# Patient Record
Sex: MALE | Race: WHITE | NOT HISPANIC OR LATINO | Employment: OTHER | URBAN - METROPOLITAN AREA
[De-identification: names, ages, dates, MRNs, and addresses within clinical notes are randomized per-mention and may not be internally consistent; named-entity substitution may affect disease eponyms.]

---

## 2018-07-20 ENCOUNTER — TELEPHONE (OUTPATIENT)
Dept: FAMILY MEDICINE CLINIC | Facility: CLINIC | Age: 28
End: 2018-07-20

## 2021-07-15 ENCOUNTER — APPOINTMENT (OUTPATIENT)
Dept: GENERAL RADIOLOGY | Facility: HOSPITAL | Age: 31
End: 2021-07-15

## 2021-07-15 ENCOUNTER — HOSPITAL ENCOUNTER (EMERGENCY)
Facility: HOSPITAL | Age: 31
Discharge: HOME | End: 2021-07-15
Attending: EMERGENCY MEDICINE | Admitting: EMERGENCY MEDICINE

## 2021-07-15 VITALS
SYSTOLIC BLOOD PRESSURE: 138 MMHG | WEIGHT: 170 LBS | OXYGEN SATURATION: 99 % | BODY MASS INDEX: 22.53 KG/M2 | HEIGHT: 73 IN | DIASTOLIC BLOOD PRESSURE: 85 MMHG | TEMPERATURE: 98.7 F | RESPIRATION RATE: 18 BRPM | HEART RATE: 68 BPM

## 2021-07-15 DIAGNOSIS — R00.2 PALPITATIONS: Primary | ICD-10-CM

## 2021-07-15 LAB
ALBUMIN SERPL-MCNC: 4.3 G/DL (ref 3.5–5.2)
ALBUMIN/GLOB SERPL: 1.9 G/DL
ALP SERPL-CCNC: 38 U/L (ref 39–117)
ALT SERPL W P-5'-P-CCNC: 34 U/L (ref 1–41)
ANION GAP SERPL CALCULATED.3IONS-SCNC: 9.1 MMOL/L (ref 5–15)
AST SERPL-CCNC: 19 U/L (ref 1–40)
BASOPHILS # BLD AUTO: 0.06 10*3/MM3 (ref 0–0.2)
BASOPHILS NFR BLD AUTO: 1.2 % (ref 0–1.5)
BILIRUB SERPL-MCNC: 0.5 MG/DL (ref 0–1.2)
BUN SERPL-MCNC: 12 MG/DL (ref 6–20)
BUN/CREAT SERPL: 15 (ref 7–25)
CALCIUM SPEC-SCNC: 9.2 MG/DL (ref 8.6–10.5)
CHLORIDE SERPL-SCNC: 103 MMOL/L (ref 98–107)
CO2 SERPL-SCNC: 24.9 MMOL/L (ref 22–29)
CREAT SERPL-MCNC: 0.8 MG/DL (ref 0.76–1.27)
D DIMER PPP FEU-MCNC: <0.27 MCGFEU/ML (ref 0–0.46)
DEPRECATED RDW RBC AUTO: 39.2 FL (ref 37–54)
EOSINOPHIL # BLD AUTO: 0.07 10*3/MM3 (ref 0–0.4)
EOSINOPHIL NFR BLD AUTO: 1.5 % (ref 0.3–6.2)
ERYTHROCYTE [DISTWIDTH] IN BLOOD BY AUTOMATED COUNT: 11.9 % (ref 12.3–15.4)
GFR SERPL CREATININE-BSD FRML MDRD: 115 ML/MIN/1.73
GLOBULIN UR ELPH-MCNC: 2.3 GM/DL
GLUCOSE SERPL-MCNC: 121 MG/DL (ref 65–99)
HCT VFR BLD AUTO: 42.5 % (ref 37.5–51)
HGB BLD-MCNC: 14.6 G/DL (ref 13–17.7)
IMM GRANULOCYTES # BLD AUTO: 0 10*3/MM3 (ref 0–0.05)
IMM GRANULOCYTES NFR BLD AUTO: 0 % (ref 0–0.5)
LYMPHOCYTES # BLD AUTO: 1.7 10*3/MM3 (ref 0.7–3.1)
LYMPHOCYTES NFR BLD AUTO: 35.3 % (ref 19.6–45.3)
MAGNESIUM SERPL-MCNC: 2 MG/DL (ref 1.6–2.6)
MCH RBC QN AUTO: 30.7 PG (ref 26.6–33)
MCHC RBC AUTO-ENTMCNC: 34.4 G/DL (ref 31.5–35.7)
MCV RBC AUTO: 89.3 FL (ref 79–97)
MONOCYTES # BLD AUTO: 0.5 10*3/MM3 (ref 0.1–0.9)
MONOCYTES NFR BLD AUTO: 10.4 % (ref 5–12)
NEUTROPHILS NFR BLD AUTO: 2.49 10*3/MM3 (ref 1.7–7)
NEUTROPHILS NFR BLD AUTO: 51.6 % (ref 42.7–76)
NRBC BLD AUTO-RTO: 0 /100 WBC (ref 0–0.2)
NT-PROBNP SERPL-MCNC: 60.2 PG/ML (ref 0–450)
PLATELET # BLD AUTO: 188 10*3/MM3 (ref 140–450)
PMV BLD AUTO: 9 FL (ref 6–12)
POTASSIUM SERPL-SCNC: 3.9 MMOL/L (ref 3.5–5.2)
PROT SERPL-MCNC: 6.6 G/DL (ref 6–8.5)
QT INTERVAL: 394 MS
RBC # BLD AUTO: 4.76 10*6/MM3 (ref 4.14–5.8)
SODIUM SERPL-SCNC: 137 MMOL/L (ref 136–145)
TROPONIN T SERPL-MCNC: <0.01 NG/ML (ref 0–0.03)
WBC # BLD AUTO: 4.82 10*3/MM3 (ref 3.4–10.8)

## 2021-07-15 PROCEDURE — 93010 ELECTROCARDIOGRAM REPORT: CPT | Performed by: INTERNAL MEDICINE

## 2021-07-15 PROCEDURE — 93005 ELECTROCARDIOGRAM TRACING: CPT | Performed by: PHYSICIAN ASSISTANT

## 2021-07-15 PROCEDURE — 85025 COMPLETE CBC W/AUTO DIFF WBC: CPT | Performed by: PHYSICIAN ASSISTANT

## 2021-07-15 PROCEDURE — 99283 EMERGENCY DEPT VISIT LOW MDM: CPT

## 2021-07-15 PROCEDURE — 80053 COMPREHEN METABOLIC PANEL: CPT | Performed by: PHYSICIAN ASSISTANT

## 2021-07-15 PROCEDURE — 84484 ASSAY OF TROPONIN QUANT: CPT | Performed by: PHYSICIAN ASSISTANT

## 2021-07-15 PROCEDURE — 83880 ASSAY OF NATRIURETIC PEPTIDE: CPT | Performed by: PHYSICIAN ASSISTANT

## 2021-07-15 PROCEDURE — 71046 X-RAY EXAM CHEST 2 VIEWS: CPT

## 2021-07-15 PROCEDURE — 85379 FIBRIN DEGRADATION QUANT: CPT | Performed by: PHYSICIAN ASSISTANT

## 2021-07-15 PROCEDURE — 83735 ASSAY OF MAGNESIUM: CPT | Performed by: PHYSICIAN ASSISTANT

## 2021-07-15 PROCEDURE — 99283 EMERGENCY DEPT VISIT LOW MDM: CPT | Performed by: PHYSICIAN ASSISTANT

## 2021-07-15 NOTE — DISCHARGE INSTRUCTIONS
You may be experiencing palpitations.  There are many factors that can cause palpitations.  Intake of stimulants is 1 such factor.  Cough and cold medicines containing antihistamines and decongestants can cause palpitations.  Likewise anxiety or panic attacks can cause palpitations.  Recommend you limit intake of stimulants.  If you drink caffeinated beverages recommend you limit yourself to 2 caffeinated beverages per day.  Also recommend you stop smoking if you are a smoker.  Nicotine is also a stimulant.  Antihistamines can also cause palpitations.  Use antihistamines only as needed. Medications as directed.  Follow-up with your PCP or cardiologist as above.  Return to ED for medical emergencies.

## 2021-07-15 NOTE — ED PROVIDER NOTES
Subjective   History of Present Illness  History of Present Illness    Chief complaint: Chest pain    Location: Left anterior chest    Quality/Severity: Moderate    Timing/Duration: Several weeks    Modifying Factors: None    Associated Symptoms: None    Narrative: Patient is a 30-year-old white male with no significant past medical history except for tony at age 20 followed by multiple surgeries, had a trach at that time.  Patient states he has been having intermittent chest pains in his left chest.  He states it feels like he can feel his heart beating.  Cannot describe the pain, states it does not feel sharp nor dull nor like heaviness/pressure.  Denies nausea associated with this, denies shortness of air, denies diaphoresis.  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  Denies family history of heart problems.  Denies radiation of pain to neck, jaw, back, shoulder, arm.  Patient is not on any prescription medications.  Patient does not smoke.  Patient states he follows with his trauma surgeon from 10 years ago and they did an EKG in the office today and told them they could not interpreted so they sent him to the ER.    Review of Systems   All other systems reviewed and are negative.      History reviewed. No pertinent past medical history.    No Known Allergies    History reviewed. No pertinent surgical history.    History reviewed. No pertinent family history.    Social History     Socioeconomic History   • Marital status:      Spouse name: Not on file   • Number of children: Not on file   • Years of education: Not on file   • Highest education level: Not on file   Tobacco Use   • Smoking status: Never Smoker   • Smokeless tobacco: Current User     Types: Snuff   Substance and Sexual Activity   • Alcohol use: Yes     Alcohol/week: 35.0 standard drinks     Types: 35 Cans of beer per week     Comment: 4-5 cand of beer nightly   •  Drug use: Never           Objective   Physical Exam  Vitals:    07/15/21 1805   BP: 138/85   Pulse:    Resp:    Temp: 98.7 °F (37.1 °C)   SpO2: 99%     Pulse 68 bpm, respirations 18 breaths/min    GENERAL: 31 YO white male, a/o x 4, NAD  SKIN: Warm pink and dry   HEENT:  PERRLA, EOM intact, conjunctiva normal, sclera clear  NECK: supple, no JVD, old tracheostomy scar  LUNGS: Clear to auscultation bilaterally without wheezes, rales or rhonchi.  No accessory muscle use and no nasal flaring.  CHEST: pain is not reproducible with palpation  CARDIAC:  Regular rate and rhythm, S1-S2.  No murmurs, rubs or gallops.  No peripheral edema.  Equal pulses bilaterally.  ABDOMEN: Soft, nontender, nondistended.  No guarding or rebound tenderness.  Normal bowel sounds.  MUSCULOSKELETAL: Moves all extremities well.  No deformity.  NEURO: Cranial nerves II through XII grossly intact.  No gross focal deficits.  Alert.  Normal speech and motor.  PSYCH: Normal mood and affect      Procedures           ED Course  ED Course as of Jul 15 1920   Thu Jul 15, 2021   1900 Patient presents complaining of chest pain but describes it as feeling his heart beat in his chest. May be related to palpitations, patient states he drinks 1-2 cups of coffee every morning.     [ND]   1901 EKG         EKG time / Interpretation time: 1803/1805  Rhythm/Rate: Sinus rhythm, 60 bpm   DE: 183  QRS, axis: 112, normal axis  QTc 401  ST and T waves: Probable normal early repolarization pattern  EKG Tracing Interpreted Contemporaneously by me, independently viewed by me and MD.    No previous to compare.    [ND]   1903 Labs ordered and reviewed by me.  CBC within normal limits, CMP within normal limits except glucose 121, proBNP 60.2, D-dimer negative, magnesium within normal limits, troponin negative.    [ND]   1905 XR Chest 2 View    Result Date: 7/15/2021  No acute cardiopulmonary findings. Signer Name: Lissa Phillips MD  Signed: 7/15/2021 6:41 PM  Workstation Name:  [id number]  [facility]        [ND]   1905 Reviewed XR chest. Independently viewed by me. Interpreted by radiologist.       [ND]   1905 Work-up thus far is negative.  I feel that the chest sensation patient is experiencing is not actually pain, could be palpitations or anxiety.    [ND]   1919 Discussed lab results with patient and significant other.  He does not want second troponin to be checked.  Discussed regular return to ER precautions as well as symptoms to look for if chest pain recurs.    [ND]   1920 Discharge plan and instructions were discussed with the patient who verbalized understanding and is in agreement with the plan, all questions were answered at this time.  Patient is aware of signs symptoms that would require immediate return to the emergency room.  Patient understands importance of following up with primary care provider for further evaluation and worsening concerns.    Patient remained afebrile, nontoxic-appearing, no acute respiratory distress throughout entire emergency room stay. Patient was discharged in improved stable condition.        [ND]      ED Course User Index  [ND] Stacia Rubin PA                                           MDM  Number of Diagnoses or Management Options  Palpitations: new and does not require workup     Amount and/or Complexity of Data Reviewed  Clinical lab tests: reviewed and ordered  Tests in the radiology section of CPT®: reviewed and ordered  Tests in the medicine section of CPT®: reviewed and ordered    Risk of Complications, Morbidity, and/or Mortality  Presenting problems: moderate  Diagnostic procedures: moderate  Management options: moderate  General comments: My differential diagnosis for chest pain includes but is not limited to:  Muscle strain, costochondritis, myositis, pleurisy, rib fracture, intercostal neuritis, herpes zoster, tumor, myocardial infarction, coronary syndrome, unstable angina, angina, aortic dissection,  mitral valve prolapse, pericarditis, palpitations, pulmonary embolus, pneumonia, pneumothorax, lung cancer, GERD, esophagitis, esophageal spasm      Patient Progress  Patient progress: stable      Final diagnoses:   Palpitations       ED Disposition  ED Disposition     ED Disposition Condition Comment    Discharge Stable           Shan Whyte MD  [address] [facility] [address]  [facility] [address]  [phone]    Call in 3 days  If symptoms worsen         Medication List      No changes were made to your prescriptions during this visit.          Stacia Rubin PA  07/15/21 1922